# Patient Record
Sex: FEMALE | URBAN - METROPOLITAN AREA
[De-identification: names, ages, dates, MRNs, and addresses within clinical notes are randomized per-mention and may not be internally consistent; named-entity substitution may affect disease eponyms.]

---

## 2024-09-23 ENCOUNTER — PATIENT OUTREACH (OUTPATIENT)
Dept: CASE MANAGEMENT | Age: 34
End: 2024-09-23

## 2024-09-23 NOTE — PROCEDURES
The office order for PCP removal request is Approved and finalized on September 23, 2024.    Removed Horacio Corona MD as the patient's Primary Care Physician